# Patient Record
Sex: FEMALE
[De-identification: names, ages, dates, MRNs, and addresses within clinical notes are randomized per-mention and may not be internally consistent; named-entity substitution may affect disease eponyms.]

---

## 2018-06-05 ENCOUNTER — HOSPITAL ENCOUNTER (OUTPATIENT)
Dept: HOSPITAL 90 - OIH | Age: 38
Discharge: HOME | End: 2018-06-05
Attending: INTERNAL MEDICINE
Payer: COMMERCIAL

## 2018-06-05 DIAGNOSIS — Z13.6: Primary | ICD-10-CM

## 2018-06-05 PROCEDURE — 75571 CT HRT W/O DYE W/CA TEST: CPT

## 2020-05-06 ENCOUNTER — HOSPITAL ENCOUNTER (OUTPATIENT)
Dept: HOSPITAL 90 - SLP | Age: 40
Discharge: HOME | End: 2020-05-06
Attending: INTERNAL MEDICINE
Payer: COMMERCIAL

## 2020-05-06 DIAGNOSIS — R53.83: ICD-10-CM

## 2020-05-06 DIAGNOSIS — G47.33: Primary | ICD-10-CM

## 2020-05-06 PROCEDURE — 95810 POLYSOM 6/> YRS 4/> PARAM: CPT

## 2020-05-06 NOTE — NUR
PATIENT CURRENT MEDICATIONS: TOPAMAX 100MG, SYNTHROID .75MG, NORVASC 5MG, LOSARTAN 100MG, 
LEXAPRO 10MG, MULTIVITAMIN.

-------------------------------------------------------------------------------

Addendum: 05/06/20 at 2325 by GLENN VEGA

-------------------------------------------------------------------------------

Amended: Links added.